# Patient Record
Sex: MALE | Race: BLACK OR AFRICAN AMERICAN | Employment: OTHER | URBAN - NONMETROPOLITAN AREA
[De-identification: names, ages, dates, MRNs, and addresses within clinical notes are randomized per-mention and may not be internally consistent; named-entity substitution may affect disease eponyms.]

---

## 2020-07-20 VITALS — BODY MASS INDEX: 30.1 KG/M2 | WEIGHT: 215 LBS | HEIGHT: 71 IN

## 2020-07-20 PROBLEM — M25.562 PAIN IN LEFT KNEE: Status: ACTIVE | Noted: 2020-01-15

## 2020-07-20 RX ORDER — OXYCODONE AND ACETAMINOPHEN 5; 325 MG/1; MG/1
TABLET ORAL
COMMUNITY
End: 2020-10-21

## 2020-07-20 RX ORDER — METOPROLOL TARTRATE 25 MG/1
25 TABLET, FILM COATED ORAL DAILY
COMMUNITY

## 2020-07-20 RX ORDER — EZETIMIBE 10 MG/1
TABLET ORAL
COMMUNITY
End: 2020-10-21

## 2020-07-20 RX ORDER — AMLODIPINE BESYLATE 5 MG/1
5 TABLET ORAL DAILY
COMMUNITY

## 2020-07-20 RX ORDER — LISINOPRIL 20 MG/1
TABLET ORAL DAILY
COMMUNITY
End: 2020-10-21

## 2020-07-20 RX ORDER — AMOXICILLIN 500 MG/1
500 CAPSULE ORAL
COMMUNITY
End: 2020-10-21

## 2020-07-20 RX ORDER — TRANDOLAPRIL 1 MG/1
1 TABLET ORAL DAILY
COMMUNITY
End: 2020-10-21

## 2020-08-18 ENCOUNTER — OFFICE VISIT (OUTPATIENT)
Dept: ORTHOPEDIC SURGERY | Age: 85
End: 2020-08-18
Payer: COMMERCIAL

## 2020-08-18 VITALS — WEIGHT: 220 LBS | BODY MASS INDEX: 30.8 KG/M2 | HEIGHT: 71 IN

## 2020-08-18 DIAGNOSIS — M17.11 PRIMARY OSTEOARTHRITIS OF RIGHT KNEE: Primary | ICD-10-CM

## 2020-08-18 PROCEDURE — 99214 OFFICE O/P EST MOD 30 MIN: CPT | Performed by: ORTHOPAEDIC SURGERY

## 2020-08-18 RX ORDER — ROSUVASTATIN CALCIUM 40 MG/1
40 TABLET, COATED ORAL
COMMUNITY

## 2020-08-18 RX ORDER — ASPIRIN 81 MG/1
TABLET ORAL DAILY
COMMUNITY

## 2020-08-18 RX ORDER — BISMUTH SUBSALICYLATE 262 MG
1 TABLET,CHEWABLE ORAL DAILY
COMMUNITY

## 2020-08-18 NOTE — PROGRESS NOTES
Providers protocol for the intake nurse to complete in patient's chart: information entered from intake sheets.     Karlydarwin He presents today for   Chief Complaint   Patient presents with    Knee Pain     right       Temperature is taken by [facility]  Travel Screening done by [facility]    Provider will complete the patient's chart

## 2020-08-18 NOTE — PROGRESS NOTES
Name: Ciara Lombardo    : 1934  Service Dept: [address] [facility] MEDICINE         Chief Complaint   Patient presents with    Knee Pain     right        Patient's Pharmacies:  No Pharmacies Listed     Visit [facility] 5' 11\" (1.803 m)   Wt 220 lb (99.8 kg)   BMI 30.68 kg/m²        No Known Allergies     Current Outpatient Medications   Medication Sig Dispense Refill    rosuvastatin (Crestor) 40 mg tablet Take 40 mg by mouth nightly.  aspirin delayed-release 81 mg tablet Take  by mouth daily.  docosahexaenoic acid/epa (FISH OIL PO) Take  by mouth.  multivitamin (ONE A DAY) tablet Take 1 Tab by mouth daily.  amLODIPine (NORVASC) 5 mg tablet Take 5 mg by mouth daily.  DICLOFENAC SODIUM PO Take  by mouth.  metoprolol tartrate (LOPRESSOR) 25 mg tablet Take  by mouth two (2) times a day.  amoxicillin (AMOXIL) 500 mg capsule Take 500 mg by mouth.  ezetimibe (ZETIA) 10 mg tablet Take  by mouth.  lisinopriL (PRINIVIL, ZESTRIL) 20 mg tablet Take  by mouth daily.  oxyCODONE-acetaminophen (Percocet) 5-325 mg per tablet Take  by mouth every four (4) hours as needed for Pain.  trandolapriL (MAVIK) 1 mg tablet Take 1 mg by mouth daily. Patient Active Problem List   Diagnosis Code    Pain in left knee M25.562        History reviewed. No pertinent family history.      Social History     Socioeconomic History    Marital status:      Spouse name: Not on file    Number of children: Not on file    Years of education: Not on file    Highest education level: Not on file   Tobacco Use    Smoking status: Never Smoker    Smokeless tobacco: Never Used   Substance and Sexual Activity    Alcohol use: Never     Frequency: Never        Past Surgical History:   Procedure Laterality Date    HX KNEE REPLACEMENT          Past Medical History:   Diagnosis Date    Pain in left knee 1/15/2020        HPI:   I have reviewed and agree with [address] and ROS and intake form in chart and the record. Review of Systems:   Patient is a pleasant appearing individual, appropriately dressed, well hydrated, well nourished, who is alert, appropriately oriented for age, and in no acute distress with a normal gait and normal affect who does not appear to be in any significant pain. Physical Exam:  Right Knee -Decrease range of motion with flexion, Knee arc of greater than 50 degrees, Some crepitation, Grossly neurovascularly intact, Good cap refill, No skin lesion, Moderate swelling, No gross instability, Some quadriceps weakness, Kellgren and Elton at least grade 3    Left Knee - Full Range of Motion, No crepitation, Grossly neurovascularly intact, Good cap refill, No skin lesion, No swelling, No gross instability, No quadriceps weakness     HPI:  The patient is here with a chief complaint of right knee pain status post left total knee replacement and wanting the right one done. His other one was done in [facility]. Pain is 1/10. ROS:  Positive for stiffness and joint swelling. X-rays of the right knee are positive for severe OA, done in [place]. Assessment/Plan:  Plan will be for a right total knee replacement with general medical and cardiac clearance. Outpatient surgery as long as he does well, and we will get his old operative reports to assess how his other implant size on his left knee was as well and go from there. Return to Office:    Follow-up Information    None

## 2020-09-09 ENCOUNTER — HOSPITAL ENCOUNTER (OUTPATIENT)
Dept: GENERAL RADIOLOGY | Age: 85
Discharge: HOME | End: 2020-09-09
Payer: COMMERCIAL

## 2020-09-09 ENCOUNTER — HOSPITAL ENCOUNTER (OUTPATIENT)
Dept: NON INVASIVE DIAGNOSTICS | Age: 85
Discharge: HOME | End: 2020-09-09
Payer: COMMERCIAL

## 2020-09-09 DIAGNOSIS — M17.11 PRIMARY OSTEOARTHRITIS OF RIGHT KNEE: ICD-10-CM

## 2020-09-09 LAB
ATRIAL RATE: 52 BPM
CALCULATED P AXIS, ECG09: 69 DEGREES
CALCULATED R AXIS, ECG10: 33 DEGREES
CALCULATED T AXIS, ECG11: 44 DEGREES
DIAGNOSIS, 93000: NORMAL
P-R INTERVAL, ECG05: 174 MS
Q-T INTERVAL, ECG07: 446 MS
QRS DURATION, ECG06: 88 MS
QTC CALCULATION (BEZET), ECG08: 414 MS
VENTRICULAR RATE, ECG03: 52 BPM

## 2020-09-09 PROCEDURE — 71046 X-RAY EXAM CHEST 2 VIEWS: CPT

## 2020-09-09 PROCEDURE — 93005 ELECTROCARDIOGRAM TRACING: CPT

## 2020-10-19 ENCOUNTER — HOSPITAL ENCOUNTER (OUTPATIENT)
Dept: LAB | Age: 85
Discharge: HOME | End: 2020-10-19

## 2020-10-19 ENCOUNTER — OFFICE VISIT (OUTPATIENT)
Dept: ORTHOPEDIC SURGERY | Age: 85
End: 2020-10-19
Payer: COMMERCIAL

## 2020-10-19 DIAGNOSIS — M25.561 RIGHT KNEE PAIN, UNSPECIFIED CHRONICITY: ICD-10-CM

## 2020-10-19 DIAGNOSIS — M17.11 PRIMARY OSTEOARTHRITIS OF RIGHT KNEE: Primary | ICD-10-CM

## 2020-10-19 DIAGNOSIS — M17.11 OSTEOARTHRITIS OF RIGHT KNEE, UNSPECIFIED OSTEOARTHRITIS TYPE: Primary | ICD-10-CM

## 2020-10-19 PROCEDURE — 99214 OFFICE O/P EST MOD 30 MIN: CPT | Performed by: ORTHOPAEDIC SURGERY

## 2020-10-19 RX ORDER — OXYCODONE AND ACETAMINOPHEN 5; 325 MG/1; MG/1
1 TABLET ORAL
Qty: 30 TAB | Refills: 0 | Status: SHIPPED | OUTPATIENT
Start: 2020-10-19 | End: 2020-10-21

## 2020-10-19 RX ORDER — CEPHALEXIN 500 MG/1
500 CAPSULE ORAL EVERY 8 HOURS
Qty: 3 CAP | Refills: 0 | Status: SHIPPED | OUTPATIENT
Start: 2020-10-19 | End: 2020-10-20

## 2020-10-19 NOTE — PATIENT INSTRUCTIONS
Knee Pain or Injury: Care Instructions Your Care Instructions Injuries are a common cause of knee problems. Sudden (acute) injuries may be caused by a direct blow to the knee. They can also be caused by abnormal twisting, bending, or falling on the knee. Pain, bruising, or swelling may be severe, and may start within minutes of the injury. Overuse is another cause of knee pain. Other causes are climbing stairs, kneeling, and other activities that use the knee. Everyday wear and tear, especially as you get older, also can cause knee pain. Rest, along with home treatment, often relieves pain and allows your knee to heal. If you have a serious knee injury, you may need tests and treatment. Follow-up care is a key part of your treatment and safety. Be sure to make and go to all appointments, and call your doctor if you are having problems. It's also a good idea to know your test results and keep a list of the medicines you take. How can you care for yourself at home? · Be safe with medicines. Read and follow all instructions on the label. ? If the doctor gave you a prescription medicine for pain, take it as prescribed. ? If you are not taking a prescription pain medicine, ask your doctor if you can take an over-the-counter medicine. · Rest and protect your knee. Take a break from any activity that may cause pain. · Put ice or a cold pack on your knee for 10 to 20 minutes at a time. Put a thin cloth between the ice and your skin. · Prop up a sore knee on a pillow when you ice it or anytime you sit or lie down for the next 3 days. Try to keep it above the level of your heart. This will help reduce swelling. · If your knee is not swollen, you can put moist heat, a heating pad, or a warm cloth on your knee. · If your doctor recommends an elastic bandage, sleeve, or other type of support for your knee, wear it as directed.  
· Follow your doctor's instructions about how much weight you can put on your leg. Use a cane, crutches, or a walker as instructed. · Follow your doctor's instructions about activity during your healing process. If you can do mild exercise, slowly increase your activity. · Reach and stay at a healthy weight. Extra weight can strain the joints, especially the knees and hips, and make the pain worse. Losing even a few pounds may help. When should you call for help? Call 911 anytime you think you may need emergency care. For example, call if: 
  · You have symptoms of a blood clot in your lung (called a pulmonary embolism). These may include: 
? Sudden chest pain. ? Trouble breathing. ? Coughing up blood. Call your doctor now or seek immediate medical care if: 
  · You have severe or increasing pain.  
  · Your leg or foot turns cold or changes color.  
  · You cannot stand or put weight on your knee.  
  · Your knee looks twisted or bent out of shape.  
  · You cannot move your knee.  
  · You have signs of infection, such as: 
? Increased pain, swelling, warmth, or redness. ? Red streaks leading from the knee. ? Pus draining from a place on your knee. ? A fever.  
  · You have signs of a blood clot in your leg (called a deep vein thrombosis), such as: 
? Pain in your calf, back of the knee, thigh, or groin. ? Redness and swelling in your leg or groin. Watch closely for changes in your health, and be sure to contact your doctor if: 
  · You have tingling, weakness, or numbness in your knee.  
  · You have any new symptoms, such as swelling.  
  · You have bruises from a knee injury that last longer than 2 weeks.  
  · You do not get better as expected. Where can you learn more? Go to http://www.gray.com/ Enter K195 in the search box to learn more about \"Knee Pain or Injury: Care Instructions. \" Current as of: June 26, 2019               Content Version: 12.6 © 7661-7623 [employer]. Care instructions adapted under license by [employer] (which disclaims liability or warranty for this information). If you have questions about a medical condition or this instruction, always ask your healthcare professional. [address] any warranty or liability for your use of this information. Knee Pain or Injury: Care Instructions Your Care Instructions Injuries are a common cause of knee problems. Sudden (acute) injuries may be caused by a direct blow to the knee. They can also be caused by abnormal twisting, bending, or falling on the knee. Pain, bruising, or swelling may be severe, and may start within minutes of the injury. Overuse is another cause of knee pain. Other causes are climbing stairs, kneeling, and other activities that use the knee. Everyday wear and tear, especially as you get older, also can cause knee pain. Rest, along with home treatment, often relieves pain and allows your knee to heal. If you have a serious knee injury, you may need tests and treatment. Follow-up care is a key part of your treatment and safety. Be sure to make and go to all appointments, and call your doctor if you are having problems. It's also a good idea to know your test results and keep a list of the medicines you take. How can you care for yourself at home? · Be safe with medicines. Read and follow all instructions on the label. ? If the doctor gave you a prescription medicine for pain, take it as prescribed. ? If you are not taking a prescription pain medicine, ask your doctor if you can take an over-the-counter medicine. · Rest and protect your knee. Take a break from any activity that may cause pain. · Put ice or a cold pack on your knee for 10 to 20 minutes at a time. Put a thin cloth between the ice and your skin. · Prop up a sore knee on a pillow when you ice it or anytime you sit or lie down for the next 3 days.  Try to keep it above the level of your heart. This will help reduce swelling. · If your knee is not swollen, you can put moist heat, a heating pad, or a warm cloth on your knee. · If your doctor recommends an elastic bandage, sleeve, or other type of support for your knee, wear it as directed. · Follow your doctor's instructions about how much weight you can put on your leg. Use a cane, crutches, or a walker as instructed. · Follow your doctor's instructions about activity during your healing process. If you can do mild exercise, slowly increase your activity. · Reach and stay at a healthy weight. Extra weight can strain the joints, especially the knees and hips, and make the pain worse. Losing even a few pounds may help. When should you call for help? Call 911 anytime you think you may need emergency care. For example, call if: 
  · You have symptoms of a blood clot in your lung (called a pulmonary embolism). These may include: 
? Sudden chest pain. ? Trouble breathing. ? Coughing up blood. Call your doctor now or seek immediate medical care if: 
  · You have severe or increasing pain.  
  · Your leg or foot turns cold or changes color.  
  · You cannot stand or put weight on your knee.  
  · Your knee looks twisted or bent out of shape.  
  · You cannot move your knee.  
  · You have signs of infection, such as: 
? Increased pain, swelling, warmth, or redness. ? Red streaks leading from the knee. ? Pus draining from a place on your knee. ? A fever.  
  · You have signs of a blood clot in your leg (called a deep vein thrombosis), such as: 
? Pain in your calf, back of the knee, thigh, or groin. ? Redness and swelling in your leg or groin. Watch closely for changes in your health, and be sure to contact your doctor if: 
  · You have tingling, weakness, or numbness in your knee.  
  · You have any new symptoms, such as swelling.  
  · You have bruises from a knee injury that last longer than 2 weeks.   · You do not get better as expected. Where can you learn more? Go to http://www.gray.com/ Enter K195 in the search box to learn more about \"Knee Pain or Injury: Care Instructions. \" Current as of: June 26, 2019               Content Version: 12.6 © 2150-5870 [employer]. Care instructions adapted under license by [employer] (which disclaims liability or warranty for this information). If you have questions about a medical condition or this instruction, always ask your healthcare professional. [address] any warranty or liability for your use of this information.

## 2020-10-19 NOTE — H&P (VIEW-ONLY)
Name: Jose Larkin : 1934 Service Dept: [address] [facility] and Sports Medicine Patient's Pharmacies: 
 
[facility] [employer] [address] [place] [address] RD AT [address] [place] [id number] Phone: [phone] Fax: [phone] Chief Complaint Patient presents with  Knee Pain There were no vitals taken for this visit. No Known Allergies Current Outpatient Medications Medication Sig Dispense Refill  rosuvastatin (Crestor) 40 mg tablet Take 40 mg by mouth nightly.  aspirin delayed-release 81 mg tablet Take  by mouth daily.  docosahexaenoic acid/epa (FISH OIL PO) Take  by mouth.  multivitamin (ONE A DAY) tablet Take 1 Tab by mouth daily.  amLODIPine (NORVASC) 5 mg tablet Take 5 mg by mouth daily.  amoxicillin (AMOXIL) 500 mg capsule Take 500 mg by mouth.  DICLOFENAC SODIUM PO Take  by mouth.  ezetimibe (ZETIA) 10 mg tablet Take  by mouth.  lisinopriL (PRINIVIL, ZESTRIL) 20 mg tablet Take  by mouth daily.  metoprolol tartrate (LOPRESSOR) 25 mg tablet Take  by mouth two (2) times a day.  oxyCODONE-acetaminophen (Percocet) 5-325 mg per tablet Take  by mouth every four (4) hours as needed for Pain.  trandolapriL (MAVIK) 1 mg tablet Take 1 mg by mouth daily. Patient Active Problem List  
Diagnosis Code  Pain in left knee M25.562 No family history on file. Social History Socioeconomic History  Marital status:  Spouse name: Not on file  Number of children: Not on file  Years of education: Not on file  Highest education level: Not on file Tobacco Use  Smoking status: Never Smoker  Smokeless tobacco: Never Used Substance and Sexual Activity  Alcohol use: Never Frequency: Never Past Surgical History:  
Procedure Laterality Date  HX KNEE REPLACEMENT Past Medical History:  
Diagnosis Date  Pain in left knee 1/15/2020 I have reviewed and agree with [address] [facility] [address] and ROS and intake form in chart and the record. Review of Systems:  
Patient is a pleasant appearing individual, appropriately dressed, well hydrated, well nourished, who is alert, appropriately oriented for age, and in no acute distress with a normal gait and normal affect who does not appear to be in any significant pain. Physical Exam: 
Right Knee -Decrease range of motion with flexion, Knee arc of greater than 50 degrees, Some crepitation, Grossly neurovascularly intact, Good cap refill, No skin lesion, Moderate swelling, No gross instability, Some quadriceps weakness, Kellgren and Elton at least grade 3 Left Knee - Full Range of Motion, No crepitation, Grossly neurovascularly intact, Good cap refill, No skin lesion, No swelling, No gross instability, No quadriceps weakness Encounter Diagnoses ICD-10-CM ICD-9-CM 1. Osteoarthritis of right knee, unspecified osteoarthritis type  M17.11 715.96  
2. Right knee pain, unspecified chronicity  M25.561 719.46 HPI: 
The patient is here with a chief complaint of right knee pain, sharp, throbbing pain. It has been the same. Advil has helped. Using it makes it worse. Pain is 6/10. ROS: 
10-point review of systems is positive for joint swelling and stiffness. He has failed conservative treatment. Assessment/Plan: 
Plan would be for right total knee replacement. He is going to do Percocet and Keflex postoperatively along with 325 mg aspirin. We will give him 3 g of Ancef preoperatively, and also we will try to match it up to his left knee which was done about a year ago, and he is medically cleared, and it will be outpatient surgery. Return to Office: Follow-up and Dispositions · Return for already scheduled for surgery. Scribed by Pebbles Green LPN as dictated by [facility] KYREE Goodrich MD. 
 
 Documentation True and Accepted Ralph Park MD

## 2020-10-19 NOTE — PROGRESS NOTES
Name: Stephanie Metcalf    : 1934     Service Dept: [address] [place] [address] and Sports Medicine    Patient's Pharmacies:    [id number] [address] AT [address] [place]  [address] [address]  Phone: [phone] Fax: [phone]       Chief Complaint   Patient presents with    Knee Pain        There were no vitals taken for this visit. No Known Allergies     Current Outpatient Medications   Medication Sig Dispense Refill    rosuvastatin (Crestor) 40 mg tablet Take 40 mg by mouth nightly.  aspirin delayed-release 81 mg tablet Take  by mouth daily.  docosahexaenoic acid/epa (FISH OIL PO) Take  by mouth.  multivitamin (ONE A DAY) tablet Take 1 Tab by mouth daily.  amLODIPine (NORVASC) 5 mg tablet Take 5 mg by mouth daily.  amoxicillin (AMOXIL) 500 mg capsule Take 500 mg by mouth.  DICLOFENAC SODIUM PO Take  by mouth.  ezetimibe (ZETIA) 10 mg tablet Take  by mouth.  lisinopriL (PRINIVIL, ZESTRIL) 20 mg tablet Take  by mouth daily.  metoprolol tartrate (LOPRESSOR) 25 mg tablet Take  by mouth two (2) times a day.  oxyCODONE-acetaminophen (Percocet) 5-325 mg per tablet Take  by mouth every four (4) hours as needed for Pain.  trandolapriL (MAVIK) 1 mg tablet Take 1 mg by mouth daily. Patient Active Problem List   Diagnosis Code    Pain in left knee M25.562        No family history on file.      Social History     Socioeconomic History    Marital status:      Spouse name: Not on file    Number of children: Not on file    Years of education: Not on file    Highest education level: Not on file   Tobacco Use    Smoking status: Never Smoker    Smokeless tobacco: Never Used   Substance and Sexual Activity    Alcohol use: Never     Frequency: Never        Past Surgical History:   Procedure Laterality Date    HX KNEE REPLACEMENT          Past Medical History:   Diagnosis Date    Pain in left knee 1/15/2020        I have reviewed and agree with [address] and ROS and intake form in chart and the record. Review of Systems:   Patient is a pleasant appearing individual, appropriately dressed, well hydrated, well nourished, who is alert, appropriately oriented for age, and in no acute distress with a normal gait and normal affect who does not appear to be in any significant pain. Physical Exam:  Right Knee -Decrease range of motion with flexion, Knee arc of greater than 50 degrees, Some crepitation, Grossly neurovascularly intact, Good cap refill, No skin lesion, Moderate swelling, No gross instability, Some quadriceps weakness, Kellgren and Elton at least grade 3    Left Knee - Full Range of Motion, No crepitation, Grossly neurovascularly intact, Good cap refill, No skin lesion, No swelling, No gross instability, No quadriceps weakness       Encounter Diagnoses     ICD-10-CM ICD-9-CM   1. Osteoarthritis of right knee, unspecified osteoarthritis type  M17.11 715.96   2. Right knee pain, unspecified chronicity  M25.561 719.46          HPI:  The patient is here with a chief complaint of right knee pain, sharp, throbbing pain. It has been the same. Advil has helped. Using it makes it worse. Pain is 6/10. ROS:  10-point review of systems is positive for joint swelling and stiffness. He has failed conservative treatment. Assessment/Plan:  Plan would be for right total knee replacement. He is going to do Percocet and Keflex postoperatively along with 325 mg aspirin. We will give him 3 g of Ancef preoperatively, and also we will try to match it up to his left knee which was done about a year ago, and he is medically cleared, and it will be outpatient surgery. Return to Office: Follow-up and Dispositions    · Return for already scheduled for surgery. Scribed by Temo Rodas LPN as dictated by [facility] KYREE Sloan MD.    Documentation True and Accepted Ralph Greene, MD

## 2020-10-21 ENCOUNTER — HOSPITAL ENCOUNTER (OUTPATIENT)
Dept: PREADMISSION TESTING | Age: 85
Discharge: HOME | End: 2020-10-21
Payer: COMMERCIAL

## 2020-10-21 ENCOUNTER — ANESTHESIA EVENT (OUTPATIENT)
Dept: SURGERY | Age: 85
End: 2020-10-21
Payer: COMMERCIAL

## 2020-10-21 VITALS
HEIGHT: 71 IN | BODY MASS INDEX: 31.78 KG/M2 | WEIGHT: 227 LBS | TEMPERATURE: 97.8 F | HEART RATE: 70 BPM | OXYGEN SATURATION: 97 % | DIASTOLIC BLOOD PRESSURE: 72 MMHG | SYSTOLIC BLOOD PRESSURE: 125 MMHG | RESPIRATION RATE: 18 BRPM

## 2020-10-21 LAB
BACTERIA SPEC CULT: ABNORMAL
BACTERIA SPEC CULT: ABNORMAL
SARS-COV-2, COV2: NORMAL
SPECIAL REQUESTS,SREQ: ABNORMAL

## 2020-10-21 PROCEDURE — 87635 SARS-COV-2 COVID-19 AMP PRB: CPT

## 2020-10-21 RX ORDER — GLUCOSAMINE SULFATE 1500 MG
POWDER IN PACKET (EA) ORAL DAILY
COMMUNITY

## 2020-10-21 RX ORDER — ZINC GLUCONATE 10 MG
LOZENGE ORAL
COMMUNITY

## 2020-10-23 LAB — SARS-COV-2, COV2NT: NOT DETECTED

## 2020-10-28 ENCOUNTER — APPOINTMENT (OUTPATIENT)
Dept: GENERAL RADIOLOGY | Age: 85
End: 2020-10-28
Attending: ORTHOPAEDIC SURGERY
Payer: COMMERCIAL

## 2020-10-28 ENCOUNTER — HOSPITAL ENCOUNTER (OUTPATIENT)
Age: 85
Setting detail: OUTPATIENT SURGERY
Discharge: HOME | End: 2020-10-28
Attending: ORTHOPAEDIC SURGERY | Admitting: ORTHOPAEDIC SURGERY
Payer: COMMERCIAL

## 2020-10-28 ENCOUNTER — ANESTHESIA (OUTPATIENT)
Dept: SURGERY | Age: 85
End: 2020-10-28
Payer: COMMERCIAL

## 2020-10-28 VITALS
DIASTOLIC BLOOD PRESSURE: 75 MMHG | HEART RATE: 62 BPM | WEIGHT: 227 LBS | RESPIRATION RATE: 18 BRPM | OXYGEN SATURATION: 97 % | TEMPERATURE: 97.5 F | SYSTOLIC BLOOD PRESSURE: 155 MMHG | HEIGHT: 71 IN | BODY MASS INDEX: 31.78 KG/M2

## 2020-10-28 PROBLEM — M17.9 KNEE OSTEOARTHRITIS: Status: ACTIVE | Noted: 2020-10-28

## 2020-10-28 LAB
ABO + RH BLD: NORMAL
BLOOD GROUP ANTIBODIES SERPL: NEGATIVE
SPECIMEN EXP DATE BLD: NORMAL

## 2020-10-28 PROCEDURE — 74011000258 HC RX REV CODE- 258

## 2020-10-28 PROCEDURE — 76010000171 HC OR TIME 2 TO 2.5 HR INTENSV-TIER 1: Performed by: ORTHOPAEDIC SURGERY

## 2020-10-28 PROCEDURE — 77030031139 HC SUT VCRL2 J&J -A: Performed by: ORTHOPAEDIC SURGERY

## 2020-10-28 PROCEDURE — 73560 X-RAY EXAM OF KNEE 1 OR 2: CPT

## 2020-10-28 PROCEDURE — 74011250636 HC RX REV CODE- 250/636

## 2020-10-28 PROCEDURE — 76210000031 HC REC RM PH II 4.5 TO 5 HR: Performed by: ORTHOPAEDIC SURGERY

## 2020-10-28 PROCEDURE — 86900 BLOOD TYPING SEROLOGIC ABO: CPT

## 2020-10-28 PROCEDURE — 77030000032 HC CUF TRNQT ZIMM -B: Performed by: ORTHOPAEDIC SURGERY

## 2020-10-28 PROCEDURE — 77030002933 HC SUT MCRYL J&J -A: Performed by: ORTHOPAEDIC SURGERY

## 2020-10-28 PROCEDURE — 77030034479 HC ADH SKN CLSR PRINEO J&J -B: Performed by: ORTHOPAEDIC SURGERY

## 2020-10-28 PROCEDURE — 76210000063 HC OR PH I REC FIRST 0.5 HR: Performed by: ORTHOPAEDIC SURGERY

## 2020-10-28 PROCEDURE — 74011250636 HC RX REV CODE- 250/636: Performed by: NURSE ANESTHETIST, CERTIFIED REGISTERED

## 2020-10-28 PROCEDURE — 76060000035 HC ANESTHESIA 2 TO 2.5 HR: Performed by: ORTHOPAEDIC SURGERY

## 2020-10-28 PROCEDURE — C1776 JOINT DEVICE (IMPLANTABLE): HCPCS | Performed by: ORTHOPAEDIC SURGERY

## 2020-10-28 PROCEDURE — 74011000258 HC RX REV CODE- 258: Performed by: NURSE ANESTHETIST, CERTIFIED REGISTERED

## 2020-10-28 PROCEDURE — 97116 GAIT TRAINING THERAPY: CPT

## 2020-10-28 PROCEDURE — 77030006835 HC BLD SAW SAG STRY -B: Performed by: ORTHOPAEDIC SURGERY

## 2020-10-28 PROCEDURE — 77030006812 HC BLD SAW RECIP STRY -B: Performed by: ORTHOPAEDIC SURGERY

## 2020-10-28 PROCEDURE — 74011250636 HC RX REV CODE- 250/636: Performed by: ORTHOPAEDIC SURGERY

## 2020-10-28 PROCEDURE — 74011000250 HC RX REV CODE- 250: Performed by: NURSE ANESTHETIST, CERTIFIED REGISTERED

## 2020-10-28 PROCEDURE — 77030041710 HC SLEEVE COMPR DVT ZIMM -B: Performed by: ORTHOPAEDIC SURGERY

## 2020-10-28 PROCEDURE — 77030014154 HC WRP CLD THER BREG -C: Performed by: ORTHOPAEDIC SURGERY

## 2020-10-28 PROCEDURE — 77030018673: Performed by: ORTHOPAEDIC SURGERY

## 2020-10-28 PROCEDURE — 74011000250 HC RX REV CODE- 250: Performed by: ORTHOPAEDIC SURGERY

## 2020-10-28 PROCEDURE — 77030002982 HC SUT POLYSRB J&J -A: Performed by: ORTHOPAEDIC SURGERY

## 2020-10-28 PROCEDURE — C1713 ANCHOR/SCREW BN/BN,TIS/BN: HCPCS | Performed by: ORTHOPAEDIC SURGERY

## 2020-10-28 PROCEDURE — 77030011266 HC ELECTRD BLD INSL COVD -A: Performed by: ORTHOPAEDIC SURGERY

## 2020-10-28 PROCEDURE — 77030010783 HC BOWL MX BN CEM J&J -B: Performed by: ORTHOPAEDIC SURGERY

## 2020-10-28 PROCEDURE — 77030013708 HC HNDPC SUC IRR PULS STRY –B: Performed by: ORTHOPAEDIC SURGERY

## 2020-10-28 PROCEDURE — 36415 COLL VENOUS BLD VENIPUNCTURE: CPT

## 2020-10-28 PROCEDURE — 2709999900 HC NON-CHARGEABLE SUPPLY: Performed by: ORTHOPAEDIC SURGERY

## 2020-10-28 PROCEDURE — 74011250637 HC RX REV CODE- 250/637: Performed by: ORTHOPAEDIC SURGERY

## 2020-10-28 PROCEDURE — 74011000272 HC RX REV CODE- 272: Performed by: ORTHOPAEDIC SURGERY

## 2020-10-28 PROCEDURE — 77030018850 HC STOCK ANTIEMB THG COVD -A: Performed by: ORTHOPAEDIC SURGERY

## 2020-10-28 PROCEDURE — 97161 PT EVAL LOW COMPLEX 20 MIN: CPT

## 2020-10-28 DEVICE — ATTUNE KNEE SYSTEM TIBIAL INSERT ROTATING PLATFORM POSTERIOR STABILIZED 8 5MM AOX
Type: IMPLANTABLE DEVICE | Site: KNEE | Status: FUNCTIONAL
Brand: ATTUNE

## 2020-10-28 DEVICE — ATTUNE PATELLA MEDIALIZED DOME 41MM CEMENTED AOX
Type: IMPLANTABLE DEVICE | Site: KNEE | Status: FUNCTIONAL
Brand: ATTUNE

## 2020-10-28 DEVICE — ATTUNE KNEE SYSTEM FEMORAL POSTERIOR STABILIZED SIZE 8 RIGHT CEMENTED
Type: IMPLANTABLE DEVICE | Site: KNEE | Status: FUNCTIONAL
Brand: ATTUNE

## 2020-10-28 DEVICE — KNEE K1 TOT HEMI STD CEM IMPL CAPPED SYNTHES: Type: IMPLANTABLE DEVICE | Site: KNEE | Status: FUNCTIONAL

## 2020-10-28 DEVICE — ATTUNE KNEE SYSTEM TIBIAL BASE ROTATING PLATFORM SIZE 8 CEMENTED
Type: IMPLANTABLE DEVICE | Site: KNEE | Status: FUNCTIONAL
Brand: ATTUNE

## 2020-10-28 RX ORDER — MIDAZOLAM HYDROCHLORIDE 1 MG/ML
INJECTION, SOLUTION INTRAMUSCULAR; INTRAVENOUS AS NEEDED
Status: DISCONTINUED | OUTPATIENT
Start: 2020-10-28 | End: 2020-10-28 | Stop reason: HOSPADM

## 2020-10-28 RX ORDER — BUPIVACAINE HYDROCHLORIDE 2.5 MG/ML
INJECTION, SOLUTION EPIDURAL; INFILTRATION; INTRACAUDAL AS NEEDED
Status: DISCONTINUED | OUTPATIENT
Start: 2020-10-28 | End: 2020-10-28 | Stop reason: HOSPADM

## 2020-10-28 RX ORDER — ONDANSETRON 2 MG/ML
4 INJECTION INTRAMUSCULAR; INTRAVENOUS
Status: DISCONTINUED | OUTPATIENT
Start: 2020-10-28 | End: 2020-10-28 | Stop reason: HOSPADM

## 2020-10-28 RX ORDER — NALOXONE HYDROCHLORIDE 0.4 MG/ML
0.4 INJECTION, SOLUTION INTRAMUSCULAR; INTRAVENOUS; SUBCUTANEOUS AS NEEDED
Status: DISCONTINUED | OUTPATIENT
Start: 2020-10-28 | End: 2020-10-28 | Stop reason: HOSPADM

## 2020-10-28 RX ORDER — SENNOSIDES 8.6 MG/1
1 TABLET ORAL 2 TIMES DAILY
Status: DISCONTINUED | OUTPATIENT
Start: 2020-10-28 | End: 2020-10-28 | Stop reason: HOSPADM

## 2020-10-28 RX ORDER — PROPOFOL 10 MG/ML
INJECTION, EMULSION INTRAVENOUS
Status: DISCONTINUED | OUTPATIENT
Start: 2020-10-28 | End: 2020-10-28 | Stop reason: HOSPADM

## 2020-10-28 RX ORDER — SODIUM CHLORIDE, SODIUM LACTATE, POTASSIUM CHLORIDE, CALCIUM CHLORIDE 600; 310; 30; 20 MG/100ML; MG/100ML; MG/100ML; MG/100ML
125 INJECTION, SOLUTION INTRAVENOUS CONTINUOUS
Status: DISCONTINUED | OUTPATIENT
Start: 2020-10-28 | End: 2020-10-28 | Stop reason: HOSPADM

## 2020-10-28 RX ORDER — KETAMINE HYDROCHLORIDE 10 MG/ML
INJECTION, SOLUTION INTRAMUSCULAR; INTRAVENOUS AS NEEDED
Status: DISCONTINUED | OUTPATIENT
Start: 2020-10-28 | End: 2020-10-28 | Stop reason: HOSPADM

## 2020-10-28 RX ORDER — ASPIRIN 325 MG
325 TABLET, DELAYED RELEASE (ENTERIC COATED) ORAL 2 TIMES DAILY
Status: DISCONTINUED | OUTPATIENT
Start: 2020-10-29 | End: 2020-10-28 | Stop reason: HOSPADM

## 2020-10-28 RX ORDER — BUPIVACAINE HYDROCHLORIDE 5 MG/ML
INJECTION, SOLUTION EPIDURAL; INTRACAUDAL AS NEEDED
Status: DISCONTINUED | OUTPATIENT
Start: 2020-10-28 | End: 2020-10-28 | Stop reason: HOSPADM

## 2020-10-28 RX ORDER — CELECOXIB 400 MG/1
400 CAPSULE ORAL
Status: DISCONTINUED | OUTPATIENT
Start: 2020-10-28 | End: 2020-10-28 | Stop reason: HOSPADM

## 2020-10-28 RX ORDER — FENTANYL CITRATE 50 UG/ML
25 INJECTION, SOLUTION INTRAMUSCULAR; INTRAVENOUS
Status: DISCONTINUED | OUTPATIENT
Start: 2020-10-28 | End: 2020-10-28 | Stop reason: HOSPADM

## 2020-10-28 RX ORDER — TRANEXAMIC ACID 100 MG/ML
INJECTION, SOLUTION INTRAVENOUS
Status: DISCONTINUED
Start: 2020-10-28 | End: 2020-10-28 | Stop reason: HOSPADM

## 2020-10-28 RX ORDER — BUPIVACAINE HYDROCHLORIDE 2.5 MG/ML
INJECTION, SOLUTION EPIDURAL; INFILTRATION; INTRACAUDAL
Status: SHIPPED | OUTPATIENT
Start: 2020-10-28 | End: 2020-10-28

## 2020-10-28 RX ORDER — CELECOXIB 400 MG/1
CAPSULE ORAL
Status: DISCONTINUED
Start: 2020-10-28 | End: 2020-10-28 | Stop reason: HOSPADM

## 2020-10-28 RX ORDER — DEXAMETHASONE SODIUM PHOSPHATE 4 MG/ML
INJECTION, SOLUTION INTRA-ARTICULAR; INTRALESIONAL; INTRAMUSCULAR; INTRAVENOUS; SOFT TISSUE
Status: SHIPPED | OUTPATIENT
Start: 2020-10-28 | End: 2020-10-28

## 2020-10-28 RX ORDER — OXYCODONE AND ACETAMINOPHEN 5; 325 MG/1; MG/1
2 TABLET ORAL
Status: DISCONTINUED | OUTPATIENT
Start: 2020-10-28 | End: 2020-10-28 | Stop reason: HOSPADM

## 2020-10-28 RX ORDER — ONDANSETRON 2 MG/ML
4 INJECTION INTRAMUSCULAR; INTRAVENOUS AS NEEDED
Status: DISCONTINUED | OUTPATIENT
Start: 2020-10-28 | End: 2020-10-28 | Stop reason: HOSPADM

## 2020-10-28 RX ORDER — SODIUM CHLORIDE 0.9 G/100ML
IRRIGANT IRRIGATION AS NEEDED
Status: DISCONTINUED | OUTPATIENT
Start: 2020-10-28 | End: 2020-10-28 | Stop reason: HOSPADM

## 2020-10-28 RX ORDER — FACIAL-BODY WIPES
10 EACH TOPICAL DAILY PRN
Status: DISCONTINUED | OUTPATIENT
Start: 2020-10-28 | End: 2020-10-28 | Stop reason: HOSPADM

## 2020-10-28 RX ORDER — SODIUM CHLORIDE, SODIUM LACTATE, POTASSIUM CHLORIDE, CALCIUM CHLORIDE 600; 310; 30; 20 MG/100ML; MG/100ML; MG/100ML; MG/100ML
INJECTION, SOLUTION INTRAVENOUS
Status: DISCONTINUED | OUTPATIENT
Start: 2020-10-28 | End: 2020-10-28 | Stop reason: HOSPADM

## 2020-10-28 RX ORDER — SODIUM CHLORIDE 0.9 % (FLUSH) 0.9 %
5-40 SYRINGE (ML) INJECTION EVERY 8 HOURS
Status: DISCONTINUED | OUTPATIENT
Start: 2020-10-28 | End: 2020-10-28 | Stop reason: HOSPADM

## 2020-10-28 RX ORDER — OXYCODONE AND ACETAMINOPHEN 10; 325 MG/1; MG/1
1 TABLET ORAL
Status: DISCONTINUED | OUTPATIENT
Start: 2020-10-28 | End: 2020-10-28 | Stop reason: HOSPADM

## 2020-10-28 RX ORDER — KETOROLAC TROMETHAMINE 30 MG/ML
15 INJECTION, SOLUTION INTRAMUSCULAR; INTRAVENOUS
Status: DISCONTINUED | OUTPATIENT
Start: 2020-10-28 | End: 2020-10-28 | Stop reason: HOSPADM

## 2020-10-28 RX ORDER — POLYMYXIN B 500000 [USP'U]/1
INJECTION, POWDER, LYOPHILIZED, FOR SOLUTION INTRAMUSCULAR; INTRATHECAL; INTRAVENOUS; OPHTHALMIC
Status: DISCONTINUED
Start: 2020-10-28 | End: 2020-10-28 | Stop reason: HOSPADM

## 2020-10-28 RX ORDER — DIPHENHYDRAMINE HCL 25 MG
25 CAPSULE ORAL
Status: DISCONTINUED | OUTPATIENT
Start: 2020-10-28 | End: 2020-10-28 | Stop reason: HOSPADM

## 2020-10-28 RX ORDER — ACETAMINOPHEN 325 MG/1
650 TABLET ORAL
Status: DISCONTINUED | OUTPATIENT
Start: 2020-10-28 | End: 2020-10-28 | Stop reason: HOSPADM

## 2020-10-28 RX ORDER — SODIUM CHLORIDE, SODIUM LACTATE, POTASSIUM CHLORIDE, CALCIUM CHLORIDE 600; 310; 30; 20 MG/100ML; MG/100ML; MG/100ML; MG/100ML
25 INJECTION, SOLUTION INTRAVENOUS CONTINUOUS
Status: DISCONTINUED | OUTPATIENT
Start: 2020-10-28 | End: 2020-10-28 | Stop reason: HOSPADM

## 2020-10-28 RX ORDER — SODIUM CHLORIDE 0.9 % (FLUSH) 0.9 %
5-40 SYRINGE (ML) INJECTION AS NEEDED
Status: DISCONTINUED | OUTPATIENT
Start: 2020-10-28 | End: 2020-10-28 | Stop reason: HOSPADM

## 2020-10-28 RX ORDER — BUPIVACAINE HYDROCHLORIDE AND EPINEPHRINE 2.5; 5 MG/ML; UG/ML
INJECTION, SOLUTION EPIDURAL; INFILTRATION; INTRACAUDAL; PERINEURAL
Status: DISCONTINUED
Start: 2020-10-28 | End: 2020-10-28 | Stop reason: HOSPADM

## 2020-10-28 RX ADMIN — Medication 10 MG: at 08:45

## 2020-10-28 RX ADMIN — CELECOXIB 400 MG: 400 CAPSULE ORAL at 06:29

## 2020-10-28 RX ADMIN — Medication 5 MG: at 07:48

## 2020-10-28 RX ADMIN — DEXAMETHASONE SODIUM PHOSPHATE 5 MG: 4 INJECTION INTRA-ARTICULAR; INTRALESIONAL; INTRAMUSCULAR; INTRAVENOUS; SOFT TISSUE at 07:53

## 2020-10-28 RX ADMIN — SODIUM CHLORIDE, POTASSIUM CHLORIDE, SODIUM LACTATE AND CALCIUM CHLORIDE: 600; 310; 30; 20 INJECTION, SOLUTION INTRAVENOUS at 07:47

## 2020-10-28 RX ADMIN — Medication 5 MG: at 08:30

## 2020-10-28 RX ADMIN — MIDAZOLAM 1 MG: 1 INJECTION INTRAMUSCULAR; INTRAVENOUS at 07:48

## 2020-10-28 RX ADMIN — Medication 10 MG: at 08:15

## 2020-10-28 RX ADMIN — PROPOFOL 20 MCG/KG/MIN: 10 INJECTION, EMULSION INTRAVENOUS at 08:29

## 2020-10-28 RX ADMIN — MIDAZOLAM 1 MG: 1 INJECTION INTRAMUSCULAR; INTRAVENOUS at 08:49

## 2020-10-28 RX ADMIN — TRANEXAMIC ACID 1 G: 1 INJECTION, SOLUTION INTRAVENOUS at 08:20

## 2020-10-28 RX ADMIN — BUPIVACAINE HYDROCHLORIDE 1.6 ML: 5 INJECTION, SOLUTION EPIDURAL; INTRACAUDAL at 08:19

## 2020-10-28 RX ADMIN — BUPIVACAINE HYDROCHLORIDE 30 ML: 2.5 INJECTION, SOLUTION EPIDURAL; INFILTRATION; INTRACAUDAL at 07:53

## 2020-10-28 RX ADMIN — TRANEXAMIC ACID 1 G: 1 INJECTION, SOLUTION INTRAVENOUS at 09:40

## 2020-10-28 RX ADMIN — SODIUM CHLORIDE, POTASSIUM CHLORIDE, SODIUM LACTATE AND CALCIUM CHLORIDE 125 ML/HR: 600; 310; 30; 20 INJECTION, SOLUTION INTRAVENOUS at 06:43

## 2020-10-28 RX ADMIN — MIDAZOLAM 2 MG: 1 INJECTION INTRAMUSCULAR; INTRAVENOUS at 08:15

## 2020-10-28 RX ADMIN — OXYCODONE HYDROCHLORIDE AND ACETAMINOPHEN 2 TABLET: 5; 325 TABLET ORAL at 14:13

## 2020-10-28 RX ADMIN — MIDAZOLAM 1 MG: 1 INJECTION INTRAMUSCULAR; INTRAVENOUS at 08:30

## 2020-10-28 NOTE — INTERVAL H&P NOTE
Update History & Physical 
 
The Patient's History and Physical was reviewed with the patient. There was no change. The surgical site was confirmed by the patient and me. Plan:  The risk, benefits, expected outcome, and alternative to the recommended procedure have been discussed with the patient. Patient understands and wants to proceed with the procedure.  
 
 
Electronically signed by Merlin Seals, MD on 10/28/2020 at 8:21 AM

## 2020-10-28 NOTE — OP NOTES
Operative Note    Patient: Sajan Zhang MRN: [id number]  Surgery Date: 10/28/2020  [unfilled]          Procedure  Primary Surgeon    KNEE ARTHROPLASTY TOTAL  Maria C Haywood MD    * Panel 2 does not exist *  * Panel 2 does not exist *    * Panel 3 does not exist *  * Panel 3 does not exist *     Surgeon(s) and Role:     * Maria C Haywood MD - Primary    Other OR Staff/Assistants:  Circ-1: Cezar Casey  Scrub Tech-1: Lita Bush  Scrub Tech-2: Otto Abby Barndi  Surg Asst-1: Belynfiliberto Loug; Chandni Julien    1st Assistant Tasks:  Closing    Pre-operative Diagnosis:  Right Knee OA    Post-operative Diagnosis: same as preop diagnosis    Anesthesia Type: Spinal     Findings: djd    Complications: No    EBL: 50 cc    Specimens: None    Implants     Implant    Component Pat Ain47uf Polyeth Dome Chai Medialized Attune - [id number] - Implanted   (Right)    Inventory item: COMPONENT PAT SSU57WD POLYETH DOME CHAI MEDIALIZED ATTUNE Model/Cat number: [id number]    : [employer] Device identifier: [id number]    Device identifier type: GS1      GUDID Information     Request status Waiting for response            As of 10/28/2020     Status: Implanted                  Component Fem Sz 8 R Knee Post Stbl Chai Attune - [id number] - Implanted   (Right)    Inventory item: COMPONENT FEM SZ 8 R KNEE POST STBL CHAI ATTUNE Model/Cat number: [id number]    : [employer] Device identifier: [id number]    Device identifier type: GS1      GUDID Information     Request status Waiting for response            As of 10/28/2020     Status: Implanted                         OPERATIVE PROCEDURE:  Please note the first assistant role was to help in patient positioning and draping of the extremity in a sterile fashion.  Also during the surgery the assistant's responsibilities included but not limited to extremity positioning during critical portions of the surgery. Assisting in using and placement of retractors during surgery. Lower extremity was prepped and draped in a sterile fashion. After adequate anesthesia was given, the patient was placed in a well-padded supine position. Subvastus arthrotomy from the tibial tubercle to the superior pole of the patella was made. Knee was hyperflexed. Intramedullary reaming of distal femur and proximal tibia was performed. 10 mm of distal femur was cut. Anterior-posterior sizing guide was used. Anterior, posterior, chamfer cuts, and box cuts were made next. Proximal tibial cut and preparation performed. Posterior osteophyte meniscal remnants were removed, and also patella was everted. Free-hand cut of the patella was made. Trial components were placed. The patient was found to have excellent range of motion and stability with all trial components. All the trial components removed. Copious irrigation performed. Distal femur, proximal tibia, and patella were impacted in place. Excessive cement was removed. After the cement was hard, Subvastus arthrotomy closed with Vicryl and Prineo stitch. Compressive dressing was applied. The patient was taken to PACU in stable condition.       Damaris Connolly MD

## 2020-10-28 NOTE — PROGRESS NOTES
PHYSICAL THERAPY EVALUATION/DISCHARGE  Patient: Marina Sun (37 y.o. male)  Date: 10/28/2020  Primary Diagnosis: Knee osteoarthritis [M17.10]  Procedure(s) (LRB):  KNEE ARTHROPLASTY TOTAL (Right) Day of Surgery   Precautions: fall risk         ASSESSMENT  Based on the objective data described below, the patient presents with gait, pain, strength, and rom deficits consistent with post operative status. Pt able to ambulate with RW, with crutch, and navigate stairs following cues provided by DPT. Other factors to consider for discharge: pt went through this procedure on 2nd knee already     Further skilled acute physical therapy is not indicated at this time. PLAN :  Recommendation for discharge: (in order for the patient to meet his/her long term goals)  Outpatient physical therapy    This discharge recommendation:  Has been made in collaboration with the attending provider and/or case management    IF patient discharges home will need the following DME: none       SUBJECTIVE:   Patient stated I don't hurt.     OBJECTIVE DATA SUMMARY:   HISTORY:    Past Medical History:   Diagnosis Date    Ill-defined condition     Follows Cardiology for regulating BP's (started on medication for it)    Pain in left knee 1/15/2020    Sleep apnea     has C PAP machine but doesn't use it     Past Surgical History:   Procedure Laterality Date    HX GI      Colonoscopy previous prophylaxis Hx Colon polyps per pt    HX HEENT Bilateral     Cataract Extractions    HX KNEE REPLACEMENT Left 12/2019       Prior level of function: independent  Personal factors and/or comorbidities impacting plan of care:     Home Situation  Home Environment: Private residence  # Steps to Enter: 4  Rails to Enter: Yes  Hand Rails : Right  Wheelchair Ramp: No  One/Two Story Residence: Two story  # of Interior Steps: 15  Interior Rails: Right  Lift Chair Available: No  Living Alone: Yes  Support Systems: Family member(s)  Patient Expects to be Discharged to[de-identified] Private residence  Current DME Used/Available at Home: Crutches    EXAMINATION/PRESENTATION/DECISION MAKING:   Critical Behavior:  Neurologic State: Alert  Orientation Level: Oriented X4  Cognition: Follows commands     Hearing:     Skin:    Edema:   Range Of Motion:  AROM: Within functional limits                       Strength:    Strength: Within functional limits                    Tone & Sensation:                  Sensation: Intact               Coordination:     Vision:      Functional Mobility:  Bed Mobility:  Rolling: Independent  Supine to Sit: Independent  Sit to Supine: Independent     Transfers:  Sit to Stand: Contact guard assistance  Stand to Sit: Contact guard assistance                       Balance:   Sitting: Intact  Standing: Intact  Ambulation/Gait Training:              Gait Description (WDL): Within defined limits     Right Side Weight Bearing: Full  Left Side Weight Bearing: Full                                 Stairs:   x 4 step using 1 hand rail, CGA           Therapeutic Exercises:       Functional Measure:  Gait with RW, CGA, 60ft  Gait with single axillary crutch, x175ft       Physical Therapy Evaluation Charge Determination   History Examination Presentation Decision-Making   LOW Complexity : Zero comorbidities / personal factors that will impact the outcome / POC LOW Complexity : 1-2 Standardized tests and measures addressing body structure, function, activity limitation and / or participation in recreation  LOW Complexity : Stable, uncomplicated        Based on the above components, the patient evaluation is determined to be of the following complexity level: LOW     Pain Ratin/10    Activity Tolerance:   Good  Please refer to the flowsheet for vital signs taken during this treatment. After treatment patient left in no apparent distress:   sitting in [facility] with nursing staff.      COMMUNICATION/EDUCATION:   The patients plan of care was discussed with: Tracy nurse.     Fall prevention education was provided and the patient/caregiver indicated understanding.     Thank you for this referral.  Ese Lowe, PT, DPT   Time Calculation: 22 mins

## 2020-10-28 NOTE — ANESTHESIA PREPROCEDURE EVALUATION
Relevant Problems   No relevant active problems       Anesthetic History   No history of anesthetic complications            Review of Systems / Medical History  Patient summary reviewed, nursing notes reviewed and pertinent labs reviewed    Pulmonary        Sleep apnea           Neuro/Psych   Within defined limits           Cardiovascular    Hypertension          Hyperlipidemia    Exercise tolerance: >4 METS     GI/Hepatic/Renal  Within defined limits              Endo/Other        Arthritis     Other Findings              Physical Exam    Airway  Mallampati: III  TM Distance: 4 - 6 cm  Neck ROM: normal range of motion   Mouth opening: Normal     Cardiovascular    Rhythm: regular  Rate: normal         Dental    Dentition: Poor dentition, Loose teeth, Lower partial plate and Full upper dentures     Pulmonary  Breath sounds clear to auscultation               Abdominal  GI exam deferred       Other Findings            Anesthetic Plan    ASA: 2  Anesthesia type: spinal and general - backup            Anesthetic plan and risks discussed with: Patient and Spouse

## 2020-10-28 NOTE — ANESTHESIA POSTPROCEDURE EVALUATION
Procedure(s):  KNEE ARTHROPLASTY TOTAL.     spinal, general - backup    Anesthesia Post Evaluation      Multimodal analgesia: multimodal analgesia used between 6 hours prior to anesthesia start to PACU discharge  Patient location during evaluation: PACU  Patient participation: complete - patient participated  Level of consciousness: awake  Pain score: 0  Pain management: adequate  Airway patency: patent  Anesthetic complications: no  Cardiovascular status: acceptable  Respiratory status: acceptable  Hydration status: acceptable  Post anesthesia nausea and vomiting:  controlled  Final Post Anesthesia Temperature Assessment:  Normothermia (36.0-37.5 degrees C)      INITIAL Post-op Vital signs: BP:  113/82, P:  63, RR:  20, SpO2:  100% (RA), T:  98.1

## 2020-10-28 NOTE — ANESTHESIA PROCEDURE NOTES
Peripheral Block    Start time: 10/28/2020 7:47 AM  End time: 10/28/2020 7:54 AM  Performed by: Barbara Cuba CRNA  Authorized by: Barbara Cuba CRNA       Pre-procedure: Indications: at surgeon's request and post-op pain management    Preanesthetic Checklist: patient identified, risks and benefits discussed, site marked, timeout performed, anesthesia consent given and patient being monitored    Timeout Time: 07:47          Block Type:   Block Type:   Adductor canal  Laterality:  Right  Monitoring:  Continuous pulse ox, frequent vital sign checks, heart rate, responsive to questions, oxygen and standard ASA monitoring  Injection Technique:  Single shot  Procedures: ultrasound guided    Patient Position: supine  Prep: DuraPrep    Location:  Mid thigh  Needle Type:  Ultraplex  Needle Gauge:  22 G  Needle Localization:  Ultrasound guidance  Medication Injected:  Bupivacaine 0.25%, 30 mL  dexamethasone (DECADRON) 4 mg/mL injection, 5 mg  Med Admin Time: 10/28/2020 7:53 AM    Assessment:  Number of attempts:  1  Injection Assessment:  Incremental injection every 5 mL, no paresthesia, local visualized surrounding nerve on ultrasound, negative aspiration for blood and no intravascular symptoms  Patient tolerance:  Patient tolerated the procedure well with no immediate complications

## 2020-10-28 NOTE — DISCHARGE INSTRUCTIONS
TOTAL KNEE REPLACEMENT DISCHARGE INFORMATION    You have undergone a Total Knee Replacement. The following list is to provide you with some expectations over the next week upon your discharge from the hospital.     1. Please continue your Aspirin 325mg every 12 hours (twice daily) or any other blood thinner as directed until Dr. Harika Michel instructs you to discontinue it. If you are not sure which blood thinner to take please contact Dr. Catherine Oreilly office next business day for clarification. 2. Please be sure to continue your thigh-high compression stockings on both sides until instructed to discontinue them. 3. Over the course of the next week, you should continue BOTH thigh high stockings, DO NOT GET THE INCISION WET until instructed to do so. 4. If an Ace wrap is placed on your knee you may remove the Ace wrap only 48 hours after your surgery. We will leave the stockings on.  5. During the course of your  over the next week, should you experience fevers of 101.5 F, a white drainage from the incision, extreme redness around the incision, or the incision begins to have a pungent smell; Please call our office or page Dr. Harika Michel whose numbers are provided in your discharge paperwork. To Page Dr. Harika Michel please call [phone] and dial 0. Have the  page whomever is on call for Orthopedics. These are signs of infection and it should be addressed immediately. 6. Please do not drive until instructed to do so.  7. It is very important for you to begin your Outpatient Physical Therapy within a couple days of the day of your discharge and your appointment should have been set up. If your physical therapy has not been set up please call our office the next business day for assistance. Details provided in a separate sheet. 8. Remove ace wrap in 48 hrs after surgery but keep stockings on.  9. Finish all antibiotics, start the antibiotics as soon as you go home if you have prescribed antibiotics.   10.  You should perform your daily home exercises at least 4 times a day 30 minutes each time. 11.  Do not place anything under your knee while sleeping at night. Elevate your heel so your  is straight while sleeping at night. 12.  Perform deep breathing exercises 10 times every hour while awake. 13.  If you had a nerve block and you are not having pain the day of the surgery, at nighttime it is okay to take 1 pain medication before going to sleep to help prevent excruciating pain when the nerve block wears off. 14.  You may be given an ice pack machine use that to help prevent swelling. Do not apply heat to the incision area. 15.  While you are awake at least 10 times every 30 minutes move your foot up and down as if you are pumping gas from both feet to help prevent swelling and to promote blood circulation in the calf. 16.  If you develop sudden onset of shortness of breath or severe calf pain please go to closest emergency room. Things to watch for:             Increased swelling of the surgical site             Spreading of redness around the incision site             Drainage of pus from the incision site             Developing a fever of 101.5 °F or higher             If any of these symptoms occur you have any questions please contact our office at [phone]. If you need to talk to Dr. Efrem Park on an urgent basis please call the hospital at [phone]. 0 for the . Please let the  know you are a surgical patient of Dr. Efrem Park and you wish to get in contact with him. If Dr. Efrem Park or his staff do not call you back within 30 minutes. Please tell the  to try again. Phone: [phone]  [email or web address]

## 2020-10-28 NOTE — PERIOP NOTES
Found pt lying in floor with legs stretched out leaning against strether. Pt. States \"my legs were sliding out from under me after feeling sweaty'. \"I feel fine\". Pt was sitting up on side of stretcher prior to fall with urinal in hand with 1 siderail up. Denies any pain. Assisted up on stretcher by 3 associates. Brief spell of not responding (approx. 5 seconds). Vital signs rechecked. Pt. Responding appropriately. Physician notified.

## 2020-10-28 NOTE — ROUTINE PROCESS
Received from PACU with foot trons on bilaterally. Polor ice care placed on right knee. Pedal pulses +2 bilaterally.

## 2020-11-03 ENCOUNTER — VIRTUAL VISIT (OUTPATIENT)
Dept: ORTHOPEDIC SURGERY | Age: 85
End: 2020-11-03
Payer: COMMERCIAL

## 2020-11-03 DIAGNOSIS — M17.11 OSTEOARTHRITIS OF RIGHT KNEE, UNSPECIFIED OSTEOARTHRITIS TYPE: Primary | ICD-10-CM

## 2020-11-03 PROCEDURE — 99441 PR PHYS/QHP TELEPHONE EVALUATION 5-10 MIN: CPT | Performed by: ORTHOPAEDIC SURGERY

## 2020-11-03 NOTE — PROGRESS NOTES
Name: Jose Larkin    : 1934     Service Dept: [facility] and Sports Medicine    Patient's Pharmacies:    [facility] [address] [place] [address] RD AT [address] [place] [address]  Phone: [phone] Fax: [phone]       Chief Complaint   Patient presents with    Knee Pain    Surgical Follow-up           No Known Allergies     Current Outpatient Medications   Medication Sig Dispense Refill    omega 3-DHA-EPA-fish oil (Fish Oil) 1,000 mg (120 mg-180 mg) capsule       fish oil-omega-3 fatty acids 340-1,000 mg capsule Take 1 Cap by mouth daily.  cholecalciferol (Vitamin D3) 25 mcg (1,000 unit) cap Take  by mouth daily.  magnesium 250 mg tab Take  by mouth.  rosuvastatin (Crestor) 40 mg tablet Take 40 mg by mouth nightly.  aspirin delayed-release 81 mg tablet Take  by mouth daily.  docosahexaenoic acid/epa (FISH OIL PO) Take  by mouth.  multivitamin (ONE A DAY) tablet Take 1 Tab by mouth daily.  amLODIPine (NORVASC) 5 mg tablet Take 5 mg by mouth daily.  DICLOFENAC SODIUM PO Take 75 mg/day by mouth daily.  metoprolol tartrate (LOPRESSOR) 25 mg tablet Take 25 mg by mouth daily. Patient Active Problem List   Diagnosis Code    Pain in left knee M25.562    Knee osteoarthritis M17.10        History reviewed. No pertinent family history.      Social History     Socioeconomic History    Marital status:      Spouse name: Not on file    Number of children: Not on file    Years of education: Not on file    Highest education level: Not on file   Tobacco Use    Smoking status: Former Smoker     Packs/day: 0.50     Years: 20.00     Pack years: 10.00     Last attempt to quit: 10/21/1980     Years since quittin.0    Smokeless tobacco: Never Used   Substance and Sexual Activity    Alcohol use: Never     Frequency: Never    Drug use: Never Past Surgical History:   Procedure Laterality Date    HX GI      Colonoscopy previous prophylaxis Hx Colon polyps per pt    HX HEENT Bilateral     Cataract Extractions    HX KNEE REPLACEMENT Left 12/2019        Past Medical History:   Diagnosis Date    Ill-defined condition     Follows Cardiology for regulating BP's (started on medication for it)    Pain in left knee 1/15/2020    Sleep apnea     has C PAP machine but doesn't use it        I have reviewed and agree with PFSH and ROS and intake form in chart and the record. Guadalupe Yang, who was evaluated through a synchronous (real-time) audio-video encounter, and/or his healthcare decision maker, is aware that it is a billable service, with coverage as determined by his insurance carrier. He provided verbal consent to proceed: Yes, and patient identification was verified. It was conducted pursuant to the emergency declaration under the [address] [place] [address], [address] [place] [address] authority and the [employer] [place] Act. A caregiver was present when appropriate. Ability to conduct physical exam was limited. I was at home. The patient was at home. Encounter Diagnoses     ICD-10-CM ICD-9-CM   1. Osteoarthritis of right knee, unspecified osteoarthritis type  M17.11 715.96        HPI:  The patient is status post right total knee replacement on 10/28/2020, doing well. Just a little bit of soreness. Assessment/Plan:  Plan at this point, activities as tolerated started, yearly appointment and go from there. Return to Office: Follow-up and Dispositions    · Return in about 1 year (around 11/3/2021). Scribed by Fred Clinton MD as dictated by Love Maravilla. Vika Enamorado MD.    Documentation True and Accepted Ralph Enamorado MD

## 2020-11-05 RX ORDER — GLUCOSAM/CHONDRO/HERB 149/HYAL 750-100 MG
TABLET ORAL
COMMUNITY

## 2020-12-09 ENCOUNTER — OFFICE VISIT (OUTPATIENT)
Dept: ORTHOPEDIC SURGERY | Age: 85
End: 2020-12-09
Payer: COMMERCIAL

## 2020-12-09 VITALS — HEIGHT: 71 IN | WEIGHT: 220 LBS | BODY MASS INDEX: 30.8 KG/M2

## 2020-12-09 DIAGNOSIS — M17.12 PRIMARY OSTEOARTHRITIS OF LEFT KNEE: Primary | ICD-10-CM

## 2020-12-09 DIAGNOSIS — M17.11 PRIMARY OSTEOARTHRITIS OF RIGHT KNEE: Primary | ICD-10-CM

## 2020-12-09 DIAGNOSIS — M17.12 PRIMARY OSTEOARTHRITIS OF LEFT KNEE: ICD-10-CM

## 2020-12-09 PROCEDURE — 99024 POSTOP FOLLOW-UP VISIT: CPT | Performed by: ORTHOPAEDIC SURGERY

## 2020-12-09 NOTE — PROGRESS NOTES
[facility]    : 1934     Service Dept: [address] [place] [address] and Sports Medicine    Patient's Pharmacies:    [id number] - [facility], [place] - [address] AT [id number]  [address] [facility] [address]  Phone: [phone] Fax: [phone]       Chief Complaint   Patient presents with    Knee Pain        Visit Vitals   5' 11\" (1.803 m)   Wt 220 lb (99.8 kg)   BMI 30.68 kg/m²      No Known Allergies   Current Outpatient Medications   Medication Sig Dispense Refill    omega 3-DHA-EPA-fish oil (Fish Oil) 1,000 mg (120 mg-180 mg) capsule       fish oil-omega-3 fatty acids 340-1,000 mg capsule Take 1 Cap by mouth daily.  cholecalciferol (Vitamin D3) 25 mcg (1,000 unit) cap Take  by mouth daily.  magnesium 250 mg tab Take  by mouth.  rosuvastatin (Crestor) 40 mg tablet Take 40 mg by mouth nightly.  aspirin delayed-release 81 mg tablet Take  by mouth daily.  docosahexaenoic acid/epa (FISH OIL PO) Take  by mouth.  multivitamin (ONE A DAY) tablet Take 1 Tab by mouth daily.  amLODIPine (NORVASC) 5 mg tablet Take 5 mg by mouth daily.  DICLOFENAC SODIUM PO Take 75 mg/day by mouth daily.  metoprolol tartrate (LOPRESSOR) 25 mg tablet Take 25 mg by mouth daily. Patient Active Problem List   Diagnosis Code    Pain in left knee M25.562    Knee osteoarthritis M17.10      History reviewed. No pertinent family history.    Social History     Socioeconomic History    Marital status:      Spouse name: Not on file    Number of children: Not on file    Years of education: Not on file    Highest education level: Not on file   Tobacco Use    Smoking status: Former Smoker     Packs/day: 0.50     Years: 20.00     Pack years: 10.00     Last attempt to quit: 10/21/1980     Years since quittin.1    Smokeless tobacco: Never Used   Substance and Sexual Activity    Alcohol use: Never Frequency: Never    Drug use: Never      Past Surgical History:   Procedure Laterality Date    HX GI      Colonoscopy previous prophylaxis Hx Colon polyps per pt    HX HEENT Bilateral     Cataract Extractions    HX KNEE REPLACEMENT Left 12/2019      Past Medical History:   Diagnosis Date    Ill-defined condition     Follows Cardiology for regulating BP's (started on medication for it)    Pain in left knee 1/15/2020    Sleep apnea     has C PAP machine but doesn't use it        I have reviewed and agree with PFSH and ROS and intake form in chart and the record. Review of Systems:   Patient is a pleasant appearing individual, appropriately dressed, well hydrated, well nourished, who is alert, appropriately oriented for age, and in no acute distress with a normal gait and normal affect who does not appear to be in any significant pain. Physical Exam:  Left knee - Neurovascularly intact with good cap refill, full range of motion and full strength, well healed incision noted, no swelling, no erythema, no instability. Right knee - Neurovascularly intact with good cap refill, full range of motion and full strength, well healed incision noted, no swelling, no erythema, no instability. Encounter Diagnoses     ICD-10-CM ICD-9-CM   1. Primary osteoarthritis of right knee  M17.11 715.16   2. Primary osteoarthritis of left knee  M17.12 715.16       HPI:  The patient is here with a chief complaint of bilateral knee pain, status post bilateral total knee replacement, doing well, just here for checkup. Pain is only 2/10 when it swells. Getting a little bit of nighttime pain. Assessment/Plan:  1. Bilateral total knee replacement. We will see him back on a yearly appointment. Return to Office: Follow-up and Dispositions    · Return in about 1 year (around 12/9/2021) for w/ Xrays. Scribed by Genoveva Barahona as dictated by Clarissa Mccormack.  Artur Scott MD.  Documentation True and Accepted Ralph ADORNO Mayra Butler MD

## 2020-12-09 NOTE — PATIENT INSTRUCTIONS
Knee Arthritis: Care Instructions Your Care Instructions Knee arthritis is a breakdown of the cartilage that cushions your knee joint. When the cartilage wears down, your bones rub against each other. This causes pain and stiffness. Knee arthritis tends to get worse with time. Treatment for knee arthritis involves reducing pain, making the leg muscles stronger, and staying at a healthy body weight. The treatment usually does not improve the health of the cartilage, but it can reduce pain and improve how well your knee works. You can take simple measures to protect your knee joints, ease your pain, and help you stay active. Follow-up care is a key part of your treatment and safety. Be sure to make and go to all appointments, and call your doctor if you are having problems. It's also a good idea to know your test results and keep a list of the medicines you take. How can you care for yourself at home? · Know that knee arthritis will cause more pain on some days than on others. · Stay at a healthy weight. Lose weight if you are overweight. When you stand up, the pressure on your knees from every pound of body weight is multiplied four times. So if you lose 10 pounds, you will reduce the pressure on your knees by 40 pounds. · Talk to your doctor or physical therapist about exercises that will help ease joint pain. ? Stretch to help prevent stiffness and to prevent injury before you exercise. You may enjoy gentle forms of yoga to help keep your knee joints and muscles flexible. ? Walk instead of jog. 
? Ride a bike. This makes your thigh muscles stronger and takes pressure off your knee. ? Wear well-fitting and comfortable shoes. ? Exercise in chest-deep water. This can help you exercise longer with less pain. ? Avoid exercises that include squatting or kneeling. They can put a lot of strain on your knees.  
? Talk to your doctor to make sure that the exercise you do is not making the arthritis worse. · Do not sit for long periods of time. Try to walk once in a while to keep your knee from getting stiff. · Ask your doctor or physical therapist whether shoe inserts may reduce your arthritis pain. · If you can afford it, get new athletic shoes at least every year. This can help reduce the strain on your knees. · Use a device to help you do everyday activities. ? A cane or walking stick can help you keep your balance when you walk. Hold the cane or walking stick in the hand opposite the painful knee. ? If you feel like you may fall when you walk, try using crutches or a front-wheeled walker. These can prevent falls that could cause more damage to your knee. ? A knee brace may help keep your knee stable and prevent pain. ? You also can use other things to make life easier, such as a higher toilet seat and handrails in the bathtub or shower. · Take pain medicines exactly as directed. ? Do not wait until you are in severe pain. You will get better results if you take it sooner. ? If you are not taking a prescription pain medicine, take an over-the-counter medicine such as acetaminophen (Tylenol), ibuprofen (Advil, Motrin), or naproxen (Aleve). Read and follow all instructions on the label. ? Do not take two or more pain medicines at the same time unless the doctor told you to. Many pain medicines have acetaminophen, which is Tylenol. Too much acetaminophen (Tylenol) can be harmful. ? Tell your doctor if you take a blood thinner, have diabetes, or have allergies to shellfish. · Ask your doctor if you might benefit from a shot of steroid medicine into your knee. This may provide pain relief for several months. · Many people take the supplements glucosamine and chondroitin for osteoarthritis. Some people feel they help, but the medical research does not show that they work. Talk to your doctor before you take these supplements. When should you call for help? Call your doctor now or seek immediate medical care if: 
  · You have sudden swelling, warmth, or pain in your knee.  
  · You have knee pain and a fever or rash.  
  · You have such bad pain that you cannot use your knee. Watch closely for changes in your health, and be sure to contact your doctor if you have any problems. Where can you learn more? Go to http://www.gray.com/ Enter J684 in the search box to learn more about \"Knee Arthritis: Care Instructions. \" Current as of: December 9, 2019               Content Version: 12.6 © 9203-4607 [employer]. Care instructions adapted under license by [employer] (which disclaims liability or warranty for this information). If you have questions about a medical condition or this instruction, always ask your healthcare professional. [address] any warranty or liability for your use of this information.

## 2021-12-06 DIAGNOSIS — M25.561 PAIN IN BOTH KNEES, UNSPECIFIED CHRONICITY: Primary | ICD-10-CM

## 2021-12-06 DIAGNOSIS — M25.562 PAIN IN BOTH KNEES, UNSPECIFIED CHRONICITY: Primary | ICD-10-CM

## 2022-03-18 PROBLEM — M25.562 PAIN IN LEFT KNEE: Status: ACTIVE | Noted: 2020-01-15

## 2022-03-19 PROBLEM — M17.9 KNEE OSTEOARTHRITIS: Status: ACTIVE | Noted: 2020-10-28

## 2024-09-03 ENCOUNTER — OFFICE VISIT (OUTPATIENT)
Age: 89
End: 2024-09-03
Payer: COMMERCIAL

## 2024-09-03 VITALS
HEIGHT: 71 IN | WEIGHT: 205 LBS | BODY MASS INDEX: 28.7 KG/M2 | HEART RATE: 61 BPM | SYSTOLIC BLOOD PRESSURE: 133 MMHG | DIASTOLIC BLOOD PRESSURE: 59 MMHG

## 2024-09-03 DIAGNOSIS — E05.00 GRAVES DISEASE: Primary | ICD-10-CM

## 2024-09-03 PROCEDURE — 1123F ACP DISCUSS/DSCN MKR DOCD: CPT | Performed by: INTERNAL MEDICINE

## 2024-09-03 PROCEDURE — 1036F TOBACCO NON-USER: CPT | Performed by: INTERNAL MEDICINE

## 2024-09-03 PROCEDURE — G8419 CALC BMI OUT NRM PARAM NOF/U: HCPCS | Performed by: INTERNAL MEDICINE

## 2024-09-03 PROCEDURE — G8428 CUR MEDS NOT DOCUMENT: HCPCS | Performed by: INTERNAL MEDICINE

## 2024-09-03 PROCEDURE — 99204 OFFICE O/P NEW MOD 45 MIN: CPT | Performed by: INTERNAL MEDICINE

## 2024-09-03 PROCEDURE — G2211 COMPLEX E/M VISIT ADD ON: HCPCS | Performed by: INTERNAL MEDICINE

## 2024-09-03 RX ORDER — DICLOFENAC SODIUM 75 MG/1
TABLET, DELAYED RELEASE ORAL
COMMUNITY
Start: 2024-08-12

## 2024-09-03 RX ORDER — LOPERAMIDE HCL 2 MG
CAPSULE ORAL
COMMUNITY
Start: 2024-08-29

## 2024-09-03 NOTE — PROGRESS NOTES
Chief Complaint   Patient presents with    Thyroid Problem       * New Patient Visit     General:  Having frequent BM and diarrhea  Upset stomach   Lost 25lb in 2-3 weeks    Labs showed hyperthyroidism   No medications   Was already on B=Blk for other reasons     No Steroids, no Amiodarone     Family History of thyroid disease:   son with graves disease     Thyroid Symptoms  **has decreased energy**, **has weight loss**, **has increased appetite** but full quickly**has sleep issues**(old) denies hair changes, no skin changes, denies sweats, denies hot intolerance, **has cold intolerance**, denies tremor, denies palpitations, **has loose or frequent stools**, **has diarrhea**, denies mood changes      Neck Symptoms  denies dysphagia, denies anterior neck pain, denies neck swelling, notes no nodules      Labs/Studies    6/13/24 TSH <0.005, FT4 2.88    7/3/24 TSH <0.005, FT4 3.35, TT3 208, FT3 6.5, , Tg Ab <1.0     Past Medical History:   Diagnosis Date    Ill-defined condition     Follows Cardiology for regulating BP's (started on medication for it)    Pain in left knee 1/15/2020    Sleep apnea     has C PAP machine but doesn't use it          Blood pressure (!) 133/59, pulse 61, height 1.803 m (5' 11\"), weight 93 kg (205 lb).         9/3/2024     1:46 PM   Weight Metrics   Weight 205 lb   BMI (Calculated) 28.7 kg/m2        EXAM:  - not done today     Assessment/Plan:   1. Graves disease  Labs c/w graves   Confirm with TSI/TRAB  Update TFTs since has been 2 mo so can start most appropriate dose of methimazole   Advise risk low blood counts/liver  Once started will get labs 2-3 weeks later  Try to get US done [facility]    No orders of the defined types were placed in this encounter.     Orders Placed This Encounter   Procedures    Thyroid Panel with TSH    T3    Thyrotropin receptor antibody    Thyroid Stimulating Immunoglobulin        Return in about 7 weeks (around 10/22/2024) for  *CAN USE LOCKED SPOT*.

## 2024-09-04 ENCOUNTER — TELEPHONE (OUTPATIENT)
Age: 89
End: 2024-09-04

## 2024-09-04 LAB
FT4I SERPL CALC-MCNC: 3.7 (ref 1.2–4.9)
T3 SERPL-MCNC: 149 NG/DL (ref 71–180)
T3RU NFR SERPL: 33 % (ref 24–39)
T4 SERPL-MCNC: 11.1 UG/DL (ref 4.5–12)
TSH RECEP AB SER-ACNC: 2.6 IU/L (ref 0–1.75)
TSH SERPL DL<=0.005 MIU/L-ACNC: <0.005 UIU/ML (ref 0.45–4.5)
TSI SER-ACNC: 1.58 IU/L (ref 0–0.55)

## 2024-09-04 NOTE — TELEPHONE ENCOUNTER
I called Mr. Hargrove and relayed the message from Dr. Lara. He seemed to understand this information.  Maral

## 2024-09-04 NOTE — TELEPHONE ENCOUNTER
Let know he is no longer hyperthyroid so does not need medication at this time.  He has both antibodies - one for over-active gland and one for under-active so could to either way in the future but right now his thyroid labs are normal.

## 2024-09-06 ENCOUNTER — TELEPHONE (OUTPATIENT)
Age: 89
End: 2024-09-06

## 2024-09-06 DIAGNOSIS — E05.00 GRAVES DISEASE: Primary | ICD-10-CM

## 2024-09-06 NOTE — TELEPHONE ENCOUNTER
Pt has f/u in 2 mo, ask he get labs in 1 mo and then before the 2 mo visit.  Can mail the lab orders.  Need to make sure not becoming hypo after initially was very hyper (but recent labs ok)    Katja Laar MD

## 2024-09-06 NOTE — TELEPHONE ENCOUNTER
I called  Rajinder and relayed the message from Dr. Lara. He understood this information and said he would do as instructed. I mailed the 2 lab orders to him  Maral

## 2024-10-15 LAB
FT4I SERPL CALC-MCNC: 3.4 (ref 1.2–4.9)
T3RU NFR SERPL: 33 % (ref 24–39)
T4 SERPL-MCNC: 10.4 UG/DL (ref 4.5–12)
TSH SERPL DL<=0.005 MIU/L-ACNC: <0.005 UIU/ML (ref 0.45–4.5)

## 2024-10-16 LAB — T3 SERPL-MCNC: 124 NG/DL (ref 71–180)

## 2024-10-17 ENCOUNTER — TELEPHONE (OUTPATIENT)
Age: 89
End: 2024-10-17

## 2024-10-17 DIAGNOSIS — E05.00 GRAVES DISEASE: Primary | ICD-10-CM

## 2024-10-17 NOTE — TELEPHONE ENCOUNTER
----- Message from Dr. Katja Lara MD sent at 10/17/2024  7:51 AM EDT -----  Let know (no my chart) labs are still improving and no thyroid medication needed at this time.  Please repeat labs 2-3 days prior to Nov appt

## 2024-10-18 DIAGNOSIS — E05.00 GRAVES DISEASE: ICD-10-CM

## 2024-10-24 DIAGNOSIS — E05.00 GRAVES DISEASE: ICD-10-CM

## 2024-11-05 LAB
FT4I SERPL CALC-MCNC: 3.9 (ref 1.2–4.9)
T3 SERPL-MCNC: 112 NG/DL (ref 71–180)
T3RU NFR SERPL: 38 % (ref 24–39)
T4 SERPL-MCNC: 10.2 UG/DL (ref 4.5–12)
TSH SERPL DL<=0.005 MIU/L-ACNC: <0.005 UIU/ML (ref 0.45–4.5)

## 2024-11-06 ENCOUNTER — OFFICE VISIT (OUTPATIENT)
Age: 89
End: 2024-11-06
Payer: COMMERCIAL

## 2024-11-06 VITALS
DIASTOLIC BLOOD PRESSURE: 69 MMHG | SYSTOLIC BLOOD PRESSURE: 144 MMHG | HEART RATE: 59 BPM | HEIGHT: 71 IN | WEIGHT: 207 LBS | BODY MASS INDEX: 28.98 KG/M2

## 2024-11-06 DIAGNOSIS — E05.00 GRAVES DISEASE: Primary | ICD-10-CM

## 2024-11-06 DIAGNOSIS — R76.8 ANTI-TPO ANTIBODIES PRESENT: ICD-10-CM

## 2024-11-06 PROCEDURE — 1123F ACP DISCUSS/DSCN MKR DOCD: CPT | Performed by: INTERNAL MEDICINE

## 2024-11-06 PROCEDURE — G2211 COMPLEX E/M VISIT ADD ON: HCPCS | Performed by: INTERNAL MEDICINE

## 2024-11-06 PROCEDURE — 99214 OFFICE O/P EST MOD 30 MIN: CPT | Performed by: INTERNAL MEDICINE

## 2024-11-06 NOTE — PROGRESS NOTES
Chief Complaint   Patient presents with    Thyroid Problem       * Since Last Visit: 9/3/2024     Was hyperthyroid  When seen by me, All AB positive (micki and graves;TPO, not Tg Ab)) but no longer hyperthyroid!  Weight loss has stopped; most other symptoms were chronic and not thyroid related     General:  From initial visit: 9/3/2024     Having frequent BM and diarrhea  Upset stomach   Lost 25lb in 2-3 weeks    Labs showed hyperthyroidism   No medications   Was already on B=Blk for other reasons     No Steroids, no Amiodarone     Family History of thyroid disease:   son with graves disease     Thyroid Symptoms  **has decreased energy**, denies change in weight, **has increased appetite** but full quickly**has sleep issues**(old) denies hair changes, no skin changes, denies sweats, denies hot intolerance, **has cold intolerance**, denies tremor, denies palpitations, **has loose or frequent stools**, **has diarrhea**improved - PCP gave med too;  denies mood changes      Neck Symptoms  denies dysphagia, denies anterior neck pain, denies neck swelling, notes no nodules      Labs/Studies    6/13/24 TSH <0.005, FT4 2.88    7/3/24 TSH <0.005, FT4 3.35, TT3 208, FT3 6.5, , Tg Ab <1.0     7/10/24 Neck US: RIFHT 5.4 x 2.8 x 2.0, Left 4.1 x 2.6 x 2.0 cm with questionable isoechoic nodule posteriorly 1.9 x 1.0 x 0.9 (TR3).  Gland is hetero with increased vascularity suggestive of thyroiditis.      Lab Results   Component Value Date/Time    TSH <0.005 11/04/2024 12:39 PM    FTI 3.9 11/04/2024 12:39 PM    T3 38 11/04/2024 12:39 PM    [id number] 112 11/04/2024 12:39 PM    TRAB 2.60 09/03/2024 02:33 PM    TSI 1.58 09/03/2024 02:33 PM        Lab Results   Component Value Date/Time    TSH <0.005 11/04/2024 12:39 PM    TSH <0.005 10/14/2024 03:24 PM    TSH <0.005 09/03/2024 02:33 PM       Past Medical History:   Diagnosis Date    Ill-defined condition     Follows Cardiology for regulating BP's (started on medication for it)

## 2025-01-05 DIAGNOSIS — E05.00 GRAVES DISEASE: ICD-10-CM

## 2025-03-05 LAB
FT4I SERPL CALC-MCNC: 2.4 (ref 1.2–4.9)
T3 SERPL-MCNC: 76 NG/DL (ref 71–180)
T3RU NFR SERPL: 30 % (ref 24–39)
T4 SERPL-MCNC: 7.9 UG/DL (ref 4.5–12)
TSH SERPL DL<=0.005 MIU/L-ACNC: 0.03 UIU/ML (ref 0.45–4.5)

## 2025-03-08 DIAGNOSIS — E05.00 GRAVES DISEASE: ICD-10-CM

## 2025-03-10 ENCOUNTER — OFFICE VISIT (OUTPATIENT)
Age: 89
End: 2025-03-10
Payer: COMMERCIAL

## 2025-03-10 VITALS
SYSTOLIC BLOOD PRESSURE: 151 MMHG | HEIGHT: 71 IN | DIASTOLIC BLOOD PRESSURE: 69 MMHG | WEIGHT: 225 LBS | BODY MASS INDEX: 31.5 KG/M2 | HEART RATE: 63 BPM

## 2025-03-10 DIAGNOSIS — E05.00 GRAVES DISEASE: Primary | ICD-10-CM

## 2025-03-10 DIAGNOSIS — R76.8 ANTI-TPO ANTIBODIES PRESENT: ICD-10-CM

## 2025-03-10 PROCEDURE — G2211 COMPLEX E/M VISIT ADD ON: HCPCS | Performed by: INTERNAL MEDICINE

## 2025-03-10 PROCEDURE — 99214 OFFICE O/P EST MOD 30 MIN: CPT | Performed by: INTERNAL MEDICINE

## 2025-03-10 PROCEDURE — 1123F ACP DISCUSS/DSCN MKR DOCD: CPT | Performed by: INTERNAL MEDICINE

## 2025-03-10 NOTE — PROGRESS NOTES
Chief Complaint   Patient presents with    Thyroid Problem       * Since Last Visit: 11/6/2024      Has some weight gain - happy about this  Feels great!    Was hyperthyroid  When seen by me, All AB positive (micki and graves;TPO, not Tg Ab)) but no longer hyperthyroid!    General:  From initial visit: 9/3/2024     Having frequent BM and diarrhea  Upset stomach   Lost 25lb in 2-3 weeks    Labs showed hyperthyroidism   No medications   Was already on B=Blk for other reasons     No Steroids, no Amiodarone   .  Family History of thyroid disease:   son with graves disease     Thyroid Symptoms  denies change in energy, **has weight gain**, denies change in appetite, denies sleep issues, denies hair changes, no skin changes, denies sweats, denies hot/cold intolerance, denies tremor, denies palpitations, denies change in bowels, no change in menses, denies mood changes    Neck Symptoms  denies dysphagia, denies anterior neck pain, denies neck swelling, notes no nodules      Labs/Studies    6/13/24 TSH <0.005, FT4 2.88    7/3/24 TSH <0.005, FT4 3.35, TT3 208, FT3 6.5, , Tg Ab <1.0     7/10/24 Neck US: RIFHT 5.4 x 2.8 x 2.0, Left 4.1 x 2.6 x 2.0 cm with questionable isoechoic nodule posteriorly 1.9 x 1.0 x 0.9 (TR3).  Gland is hetero with increased vascularity suggestive of thyroiditis.      Lab Results   Component Value Date/Time    TSH 0.034 03/03/2025 03:00 PM    FTI 2.4 03/03/2025 03:00 PM    T3 30 03/03/2025 03:00 PM    [id number] 76 03/03/2025 03:00 PM    TRAB 2.60 09/03/2024 02:33 PM    TSI 1.58 09/03/2024 02:33 PM      Lab Results   Component Value Date/Time    TSH 0.034 03/03/2025 03:00 PM    TSH <0.005 11/04/2024 12:39 PM    TSH <0.005 10/14/2024 03:24 PM    TSH <0.005 09/03/2024 02:33 PM     Past Medical History:   Diagnosis Date    Ill-defined condition     Follows Cardiology for regulating BP's (started on medication for it)    Pain in left knee 1/15/2020    Sleep apnea     has C PAP machine but doesn't use

## 2025-07-10 ENCOUNTER — OFFICE VISIT (OUTPATIENT)
Age: 89
End: 2025-07-10
Payer: COMMERCIAL

## 2025-07-10 DIAGNOSIS — R76.8 ANTI-TPO ANTIBODIES PRESENT: ICD-10-CM

## 2025-07-10 DIAGNOSIS — E05.00 GRAVES DISEASE: Primary | ICD-10-CM

## 2025-07-10 DIAGNOSIS — E05.00 GRAVES DISEASE: ICD-10-CM

## 2025-07-10 PROCEDURE — 1123F ACP DISCUSS/DSCN MKR DOCD: CPT | Performed by: INTERNAL MEDICINE

## 2025-07-10 PROCEDURE — G2211 COMPLEX E/M VISIT ADD ON: HCPCS | Performed by: INTERNAL MEDICINE

## 2025-07-10 PROCEDURE — 99214 OFFICE O/P EST MOD 30 MIN: CPT | Performed by: INTERNAL MEDICINE

## 2025-07-10 NOTE — PROGRESS NOTES
Chief Complaint   Patient presents with    Thyroid Problem       * Since Last Visit: 3/10/2025         Feels great still  No hyper or hypothyroid symptoms    Was hyperthyroid  When seen by me, All AB positive (micki and graves;TPO, not Tg Ab)) but no longer hyperthyroid!    General:  From initial visit: 9/3/2024     Having frequent BM and diarrhea  Upset stomach   Lost 25lb in 2-3 weeks    Labs showed hyperthyroidism   No medications   Was already on B=Blk for other reasons     No Steroids, no Amiodarone   .  Family History of thyroid disease:   son with graves disease     Thyroid Symptoms  denies change in energy, denies change in weight, denies change in appetite, denies sleep issues, denies hair changes, no skin changes, denies sweats, denies hot/cold intolerance, denies tremor, denies palpitations, denies change in bowels, no change in menses, denies mood changes    Neck Symptoms  denies dysphagia, denies anterior neck pain, denies neck swelling, notes no nodules      Labs/Studies    6/13/24 TSH <0.005, FT4 2.88    7/3/24 TSH <0.005, FT4 3.35, TT3 208, FT3 6.5, , Tg Ab <1.0     7/10/24 Neck US: RIFHT 5.4 x 2.8 x 2.0, Left 4.1 x 2.6 x 2.0 cm with questionable isoechoic nodule posteriorly 1.9 x 1.0 x 0.9 (TR3).  Gland is hetero with increased vascularity suggestive of thyroiditis.      Lab Results   Component Value Date/Time    TSH 0.034 03/03/2025 03:00 PM    FTI 2.4 03/03/2025 03:00 PM    T3 30 03/03/2025 03:00 PM    [id number] 76 03/03/2025 03:00 PM    TRAB 2.60 09/03/2024 02:33 PM    TSI 1.58 09/03/2024 02:33 PM      Lab Results   Component Value Date/Time    TSH 0.034 03/03/2025 03:00 PM    TSH <0.005 11/04/2024 12:39 PM    TSH <0.005 10/14/2024 03:24 PM    TSH <0.005 09/03/2024 02:33 PM     Past Medical History:   Diagnosis Date    Ill-defined condition     Follows Cardiology for regulating BP's (started on medication for it)    Pain in left knee 1/15/2020    Sleep apnea     has C PAP machine but doesn't

## 2025-07-11 LAB — T3 SERPL-MCNC: 86 NG/DL (ref 71–180)

## 2025-07-12 LAB
FT4I SERPL CALC-MCNC: 2.1 (ref 1.2–4.9)
T3RU NFR SERPL: 29 % (ref 24–39)
T4 SERPL-MCNC: 7.4 UG/DL (ref 4.5–12)
TSH SERPL DL<=0.005 MIU/L-ACNC: 0.55 UIU/ML (ref 0.45–4.5)

## 2025-07-14 ENCOUNTER — RESULTS FOLLOW-UP (OUTPATIENT)
Age: 89
End: 2025-07-14

## (undated) DEVICE — UNDERGLOVE SURG SZ 7.5 BLU LTX FREE SYN POLYISOPRENE

## (undated) DEVICE — BLADE ELECTRODE: Brand: VALLEYLAB

## (undated) DEVICE — GUARDIAN LVC: Brand: GUARDIAN

## (undated) DEVICE — INTENDED FOR TISSUE SEPARATION, AND OTHER PROCEDURES THAT REQUIRE A SHARP SURGICAL BLADE TO PUNCTURE OR CUT.: Brand: BARD-PARKER SAFETY BLADES SIZE 11, STERILE

## (undated) DEVICE — CEMENT BNE 40GM FULL DOSE PMMA W/ GENT HI VISC RADPQ LNG

## (undated) DEVICE — T5 HOOD WITH PEEL AWAY FACE SHIELD

## (undated) DEVICE — Device

## (undated) DEVICE — SUTURE VCRL SZ 1 L27IN ABSRB UD CT-1 L36MM 1/2 CIR J261H

## (undated) DEVICE — PAD THER X LG KNEE WRP ON PLR CARE

## (undated) DEVICE — HANDPIECE SET WITH HIGH FLOW TIP AND SUCTION TUBE: Brand: INTERPULSE

## (undated) DEVICE — SHEET,DRAPE,70X100,STERILE: Brand: MEDLINE

## (undated) DEVICE — GOWN,SIRUS,POLYRNF,SETINSLV,XL,20/CS: Brand: MEDLINE

## (undated) DEVICE — YANKAUER,BULB TIP,W/O VENT,RIGID,STERILE: Brand: MEDLINE

## (undated) DEVICE — SOL INJ SOD CL 0.9% 1000ML BG --

## (undated) DEVICE — TOTAL KNEE PACK: Brand: MEDLINE INDUSTRIES, INC.

## (undated) DEVICE — SPONGE LAP SOFT 18X18 IN X RAY DETECTABLE

## (undated) DEVICE — STERILE POLYISOPRENE POWDER-FREE SURGICAL GLOVES WITH EMOLLIENT COATING: Brand: PROTEXIS

## (undated) DEVICE — 3M™ COBAN™ NL STERILE NON-LATEX SELF-ADHERENT WRAP, 2086S, 6 IN X 5 YD (15 CM X 4,5 M), 12 ROLLS/CASE: Brand: 3M™ COBAN™

## (undated) DEVICE — SPONGE GZ W4XL4IN COT 12 PLY TYP VII WVN C FLD DSGN

## (undated) DEVICE — 3M™ SURGICAL CLIPPER PROFESSIONAL BLADE 9680: Brand: 3M™

## (undated) DEVICE — PADDING CAST W6INXL4YD SYNTHETIC NATURAL PROTOUCH

## (undated) DEVICE — STRYKER PERFORMANCE SERIES SAGITTAL BLADE: Brand: STRYKER PERFORMANCE SERIES

## (undated) DEVICE — BNDG ELAS HK LOOP 6X5YD NS -- MATRIX

## (undated) DEVICE — TOWEL,OR,DSP,ST,BLUE,STD,4/PK,20PK/CS: Brand: MEDLINE

## (undated) DEVICE — Device: Brand: VASOPRESS

## (undated) DEVICE — DRAPE,REIN 53X77,STERILE: Brand: MEDLINE

## (undated) DEVICE — SMARTGOWN SURGICAL GOWN, LARGE: Brand: CONVERTORS

## (undated) DEVICE — TUBING, SUCTION, 3/16" X 10', SCALLOP: Brand: MEDLINE

## (undated) DEVICE — PREP SKN CHLRAPRP APL 26ML STR --

## (undated) DEVICE — SUTURE MCRYL SZ 4-0 L27IN ABSRB UD L19MM PS-2 1/2 CIR PRIM Y426H

## (undated) DEVICE — BOWL BNE CEM MIX SPAT CURET SMARTMIX CTS

## (undated) DEVICE — DRESSING FOAM POST OPERATIVE 35X10 CM MEPILEX BORDER

## (undated) DEVICE — ZIMMER® STERILE DISPOSABLE TOURNIQUET CUFF WITH PLC, DUAL PORT, SINGLE BLADDER, 34 IN. (86 CM)

## (undated) DEVICE — SYSTEM SKIN CLSR 22CM DERMBND PRINEO

## (undated) DEVICE — 1200CC GUARDIAN II: Brand: GUARDIAN

## (undated) DEVICE — HYPODERMIC SAFETY NEEDLE: Brand: MAGELLAN

## (undated) DEVICE — BLADE SAW W12.5XL70MM THK1MM RECIP DBL SIDE OFFSET

## (undated) DEVICE — INTENDED FOR TISSUE SEPARATION, AND OTHER PROCEDURES THAT REQUIRE A SHARP SURGICAL BLADE TO PUNCTURE OR CUT.: Brand: BARD-PARKER SAFETY BLADES SIZE 10, STERILE

## (undated) DEVICE — SUTURE VCRL SZ 0 L27IN ABSRB UD L36MM CT-1 1/2 CIR J260H

## (undated) DEVICE — 3M™ IOBAN™ 2 ANTIMICROBIAL INCISE DRAPE 6650EZ: Brand: IOBAN™ 2

## (undated) DEVICE — ANTI-EMBOLISM STOCKINGS,THIGH LENGTH,LARGE,REGULAR,SIZE H: Brand: T.E.D.

## (undated) DEVICE — GLOVE SURG 7 BIOGEL PI ULTRATOUCH G

## (undated) DEVICE — SUTURE VCRL SZ 2-0 L27IN ABSRB UD L26MM CT-2 1/2 CIR J269H